# Patient Record
Sex: FEMALE | Race: WHITE | NOT HISPANIC OR LATINO | Employment: FULL TIME | URBAN - METROPOLITAN AREA
[De-identification: names, ages, dates, MRNs, and addresses within clinical notes are randomized per-mention and may not be internally consistent; named-entity substitution may affect disease eponyms.]

---

## 2018-02-26 ENCOUNTER — HISTORICAL (OUTPATIENT)
Dept: INTERNAL MEDICINE | Facility: CLINIC | Age: 23
End: 2018-02-26

## 2018-02-26 LAB
ALBUMIN SERPL-MCNC: 4.2 GM/DL (ref 3.4–5)
ALBUMIN/GLOB SERPL: 1 RATIO (ref 1–2)
ALP SERPL-CCNC: 74 UNIT/L (ref 45–117)
ALT SERPL-CCNC: 17 UNIT/L (ref 12–78)
AMYLASE SERPL-CCNC: 36 UNIT/L (ref 25–115)
AST SERPL-CCNC: 12 UNIT/L (ref 15–37)
BILIRUB SERPL-MCNC: 0.6 MG/DL (ref 0.2–1)
BILIRUBIN DIRECT+TOT PNL SERPL-MCNC: 0.2 MG/DL
BILIRUBIN DIRECT+TOT PNL SERPL-MCNC: 0.4 MG/DL
BUN SERPL-MCNC: 11 MG/DL (ref 7–18)
CALCIUM SERPL-MCNC: 9.1 MG/DL (ref 8.5–10.1)
CHLORIDE SERPL-SCNC: 107 MMOL/L (ref 98–107)
CO2 SERPL-SCNC: 26 MMOL/L (ref 21–32)
CREAT SERPL-MCNC: 0.4 MG/DL (ref 0.6–1.3)
ERYTHROCYTE [DISTWIDTH] IN BLOOD BY AUTOMATED COUNT: 12.6 % (ref 11.5–14.5)
ERYTHROCYTE [SEDIMENTATION RATE] IN BLOOD: 8 MM/HR (ref 0–20)
GLOBULIN SER-MCNC: 3.5 GM/ML (ref 2.3–3.5)
GLUCOSE SERPL-MCNC: 84 MG/DL (ref 74–106)
HCT VFR BLD AUTO: 37.5 % (ref 35–46)
HGB BLD-MCNC: 12.9 GM/DL (ref 12–16)
MCH RBC QN AUTO: 32.3 PG (ref 26–34)
MCHC RBC AUTO-ENTMCNC: 34.4 GM/DL (ref 31–37)
MCV RBC AUTO: 94 FL (ref 80–100)
PLATELET # BLD AUTO: 201 X10(3)/MCL (ref 130–400)
PMV BLD AUTO: 10.6 FL (ref 7.4–10.4)
POTASSIUM SERPL-SCNC: 3.5 MMOL/L (ref 3.5–5.1)
PROT SERPL-MCNC: 7.7 GM/DL (ref 6.4–8.2)
RBC # BLD AUTO: 3.99 X10(6)/MCL (ref 4–5.2)
SODIUM SERPL-SCNC: 140 MMOL/L (ref 136–145)
WBC # SPEC AUTO: 9.7 X10(3)/MCL (ref 4.5–11)

## 2018-04-16 ENCOUNTER — HISTORICAL (OUTPATIENT)
Dept: ENDOSCOPY | Facility: HOSPITAL | Age: 23
End: 2018-04-16

## 2018-04-16 LAB — B-HCG SERPL QL: NEGATIVE

## 2020-03-10 LAB
BILIRUB SERPL-MCNC: NEGATIVE MG/DL
BLOOD URINE, POC: NORMAL
CLARITY, POC UA: CLEAR
COLOR, POC UA: YELLOW
GLUCOSE UR QL STRIP: NEGATIVE
KETONES UR QL STRIP: NEGATIVE
LEUKOCYTE EST, POC UA: NEGATIVE
NITRITE, POC UA: NEGATIVE
PH, POC UA: 5.5
PROTEIN, POC: NEGATIVE
SPECIFIC GRAVITY, POC UA: 1.02
UROBILINOGEN, POC UA: NORMAL

## 2021-01-19 LAB — POC BETA-HCG (QUAL): POSITIVE

## 2021-03-10 LAB
BASOPHILS # BLD AUTO: 0.01 X10(3)/MCL (ref 0.01–0.08)
BASOPHILS NFR BLD AUTO: 0.1 % (ref 0.1–1.2)
EOSINOPHIL # BLD AUTO: 0.08 X10(3)/MCL (ref 0.04–0.36)
EOSINOPHIL NFR BLD AUTO: 0.9 % (ref 0.7–7)
ERYTHROCYTE [DISTWIDTH] IN BLOOD BY AUTOMATED COUNT: 12.8 % (ref 11–14.5)
GLUCOSE 1H P 100 G GLC PO SERPL-MCNC: 128 MG/DL (ref 70–140)
HCT VFR BLD AUTO: 39.1 % (ref 36–48)
HCV AB SERPL QL IA: NORMAL
HGB BLD-MCNC: 13.4 G/DL (ref 11.8–16)
IMM GRANULOCYTES # BLD AUTO: 0.03 X10E3/UL (ref 0–0.03)
IMM GRANULOCYTES NFR BLD AUTO: 0.3 % (ref 0–0.5)
LYMPHOCYTES # BLD AUTO: 1.93 X10(3)/MCL (ref 1.16–3.74)
LYMPHOCYTES NFR BLD AUTO: 21.1 % (ref 20–55)
MCH RBC QN AUTO: 31.2 PG (ref 27–34)
MCHC RBC AUTO-ENTMCNC: 34.3 G/DL (ref 31–37)
MCV RBC AUTO: 90.9 FL (ref 79–99)
MONOCYTES # BLD AUTO: 0.57 X10(3)/MCL (ref 0.24–0.36)
MONOCYTES NFR BLD AUTO: 6.2 % (ref 4.7–12.5)
NEUTROPHILS # BLD AUTO: 6.54 X10(3)/MCL (ref 1.56–6.13)
NEUTROPHILS NFR BLD AUTO: 71.4 % (ref 37–73)
PLATELET # BLD AUTO: 222 X10(3)/MCL (ref 140–371)
PMV BLD AUTO: 11.5 FL (ref 9.4–12.4)
RBC # BLD AUTO: 4.3 X10(6)/MCL (ref 4–5.1)
WBC # SPEC AUTO: 9.2 X10(3)/MCL (ref 4–11.5)

## 2021-06-30 LAB
BASOPHILS # BLD AUTO: 0.02 X10(3)/MCL (ref 0.01–0.08)
BASOPHILS NFR BLD AUTO: 0.2 % (ref 0.1–1.2)
EOSINOPHIL # BLD AUTO: 0.05 X10(3)/MCL (ref 0.04–0.36)
EOSINOPHIL NFR BLD AUTO: 0.4 % (ref 0.7–7)
ERYTHROCYTE [DISTWIDTH] IN BLOOD BY AUTOMATED COUNT: 12.5 % (ref 11–14.5)
GLUCOSE 1H P 100 G GLC PO SERPL-MCNC: 129 MG/DL (ref 70–140)
HCT VFR BLD AUTO: 33 % (ref 36–48)
HGB BLD-MCNC: 11.1 G/DL (ref 11.8–16)
IMM GRANULOCYTES # BLD AUTO: 0.12 X10E3/UL (ref 0–0.03)
IMM GRANULOCYTES NFR BLD AUTO: 1 % (ref 0–0.5)
LYMPHOCYTES # BLD AUTO: 1.4 X10(3)/MCL (ref 1.16–3.74)
LYMPHOCYTES NFR BLD AUTO: 12.1 % (ref 20–55)
MCH RBC QN AUTO: 32.3 PG (ref 27–34)
MCHC RBC AUTO-ENTMCNC: 33.6 G/DL (ref 31–37)
MCV RBC AUTO: 95.9 FL (ref 79–99)
MONOCYTES # BLD AUTO: 0.66 X10(3)/MCL (ref 0.24–0.36)
MONOCYTES NFR BLD AUTO: 5.7 % (ref 4.7–12.5)
NEUTROPHILS # BLD AUTO: 9.31 X10(3)/MCL (ref 1.56–6.13)
NEUTROPHILS NFR BLD AUTO: 80.6 % (ref 37–73)
PLATELET # BLD AUTO: 222 X10(3)/MCL (ref 140–371)
PMV BLD AUTO: 11.1 FL (ref 9.4–12.4)
RBC # BLD AUTO: 3.44 X10(6)/MCL (ref 4–5.1)
RPR SER QL: NORMAL
WBC # SPEC AUTO: 11.6 X10(3)/MCL (ref 4–11.5)

## 2021-08-11 LAB
ALBUMIN SERPL-MCNC: 3.5 G/DL (ref 3.4–5)
ALBUMIN/GLOB SERPL: 1.3 {RATIO}
ALP SERPL-CCNC: 173 U/L (ref 50–144)
ALT SERPL-CCNC: 20 U/L (ref 1–45)
ANION GAP SERPL CALC-SCNC: 7 MMOL/L (ref 7–16)
AST SERPL-CCNC: 22 U/L (ref 14–36)
BASOPHILS # BLD AUTO: 0.03 X10(3)/MCL (ref 0.01–0.08)
BASOPHILS NFR BLD AUTO: 0.3 % (ref 0.1–1.2)
BILIRUB SERPL-MCNC: 0.35 MG/DL (ref 0.1–1)
BUN SERPL-MCNC: 7 MG/DL (ref 7–20)
CALCIUM SERPL-MCNC: 9.2 MG/DL (ref 8.4–10.2)
CHLORIDE SERPL-SCNC: 107 MMOL/L (ref 94–110)
CO2 SERPL-SCNC: 21 MMOL/L (ref 21–32)
CREAT SERPL-MCNC: 0.3 MG/DL (ref 0.52–1.04)
CREAT/UREA NIT SERPL: 23.3 (ref 12–20)
EOSINOPHIL # BLD AUTO: 0.07 X10(3)/MCL (ref 0.04–0.36)
EOSINOPHIL NFR BLD AUTO: 0.6 % (ref 0.7–7)
ERYTHROCYTE [DISTWIDTH] IN BLOOD BY AUTOMATED COUNT: 12.6 % (ref 11–14.5)
GLOBULIN SER-MCNC: 2.8 G/DL (ref 2–3.9)
GLUCOSE SERPL-MCNC: 108 MGM./DL (ref 70–115)
HCT VFR BLD AUTO: 32.1 % (ref 36–48)
HGB BLD-MCNC: 10.7 G/DL (ref 11.8–16)
IMM GRANULOCYTES # BLD AUTO: 0.09 X10E3/UL (ref 0–0.03)
IMM GRANULOCYTES NFR BLD AUTO: 0.8 % (ref 0–0.5)
LDH SERPL-CCNC: 324 U/L (ref 313–618)
LYMPHOCYTES # BLD AUTO: 1.71 X10(3)/MCL (ref 1.16–3.74)
LYMPHOCYTES NFR BLD AUTO: 14.3 % (ref 20–55)
MCH RBC QN AUTO: 30.7 PG (ref 27–34)
MCHC RBC AUTO-ENTMCNC: 33.3 G/DL (ref 31–37)
MCV RBC AUTO: 92.2 FL (ref 79–99)
MONOCYTES # BLD AUTO: 0.66 X10(3)/MCL (ref 0.24–0.36)
MONOCYTES NFR BLD AUTO: 5.5 % (ref 4.7–12.5)
NEUTROPHILS # BLD AUTO: 9.43 X10(3)/MCL (ref 1.56–6.13)
NEUTROPHILS NFR BLD AUTO: 78.5 % (ref 37–73)
PLATELET # BLD AUTO: 234 X10(3)/MCL (ref 140–371)
PMV BLD AUTO: 11.5 FL (ref 9.4–12.4)
POTASSIUM SERPL-SCNC: 3.8 MMOL/L (ref 3.5–5.1)
PROT SERPL-MCNC: 6.3 G/DL (ref 6.3–8.2)
RBC # BLD AUTO: 3.48 X10(6)/MCL (ref 4–5.1)
SODIUM SERPL-SCNC: 135 MMOL/L (ref 135–145)
URATE SERPL-MCNC: 3.2 MG/DL (ref 2.6–7.2)
WBC # SPEC AUTO: 12 X10(3)/MCL (ref 4–11.5)

## 2021-09-02 LAB
HBV SURFACE AG SERPL QL IA: NEGATIVE
HIV 1+2 AB+HIV1 P24 AG SERPL QL IA: NORMAL
RPR SER QL: NORMAL

## 2021-09-09 LAB
BILIRUB SERPL-MCNC: NEGATIVE MG/DL
BLOOD URINE, POC: NORMAL
CLARITY, POC UA: NORMAL
COLOR, POC UA: YELLOW
GLUCOSE UR QL STRIP: NEGATIVE
KETONES UR QL STRIP: NEGATIVE
LEUKOCYTE EST, POC UA: NORMAL
NITRITE, POC UA: NEGATIVE
PH, POC UA: 7
PROTEIN, POC: NORMAL
SPECIFIC GRAVITY, POC UA: 1.02
UROBILINOGEN, POC UA: NORMAL

## 2021-10-19 LAB
PAP RECOMMENDATION EXT: NORMAL
PAP SMEAR: NORMAL

## 2022-04-11 ENCOUNTER — HISTORICAL (OUTPATIENT)
Dept: ADMINISTRATIVE | Facility: HOSPITAL | Age: 27
End: 2022-04-11

## 2022-04-27 VITALS
OXYGEN SATURATION: 98 % | SYSTOLIC BLOOD PRESSURE: 118 MMHG | DIASTOLIC BLOOD PRESSURE: 70 MMHG | BODY MASS INDEX: 30.22 KG/M2 | HEIGHT: 66 IN | WEIGHT: 188 LBS

## 2022-05-08 ENCOUNTER — HISTORICAL (OUTPATIENT)
Dept: ADMINISTRATIVE | Facility: HOSPITAL | Age: 27
End: 2022-05-08

## 2022-09-19 NOTE — OP NOTE
DATE OF SURGERY:    04/16/2018    ATTENDING PHYSICIAN:  Jena Moreland MD    RESIDENT: Blessing Malagon MD, PGY2    PREOPERATIVE DIAGNOSES:    1. Chronic abdominal pain.  2. History of gastroesophageal reflux disease.  3. History of colitis.    POSTOPERATIVE DIAGNOSES:    1. Chronic abdominal pain.  2. Gastritis.  3. Small hiatal hernia.    PROCEDURES PERFORMED:    1. Esophagogastroduodenoscopy with biopsies.    2. Colonoscopy.    ANESTHESIA:  Sedation with propofol.    INDICATIONS FOR PROCEDURE:  Patient is a 22-year-old female who has a history of chronic abdominal pain associated with episodic nausea and vomiting as well as diarrhea.  The patient has experienced this for many years.  She has a history of GERD treated with a proton pump inhibitor.  She last had an EGD and colonoscopy in 2015 at an outside facility.  Per report, at that time she was diagnosed with nonspecific colitis.  She was referred to our GI clinic for repeat EGD and colonoscopy since she is having persistent symptoms.    PROCEDURE IN DETAIL:  After appropriate informed consent had been obtained and all questions had been answered, the patient was brought to the procedure room.  She was connected to the appropriate monitoring devices.  Oxygen was administered to the patient continuously via nasal cannula.  A time-out was performed verifying correct patient and procedure.  The patient was then placed in the left lateral decubitus position.  IV sedation was administered intermittently throughout the procedure by the anesthesia team.  After the appropriate level of sedation had been achieved, the procedure was started.  We began with the upper endoscopy.  A well-lubricated endoscope was advanced under direct visualization over the tongue and through the esophagus, the stomach, and the duodenum.  It was advanced to the 2nd portion of the duodenum.  The scope was then slowly withdrawn and the mucosa was carefully evaluated.  No duodenal mucosal  1140  As per provider order, a Volare splint and sling was applied to the patient's left wrist. Patient had good capillary refill, sensation, and movement in the fingers of the left hand, both before and after application of the splint and sling.      Bar Zambrano  09/19/22 114 abnormalities were identified.  Views of the stomach did demonstrate some mild nonerosive gastritis, predominantly in the antrum.  Biopsies of the antrum were taken using cold forceps x2.  The remainder of the stomach was then carefully inspected.  The scope was retroflexed.  The patient was noted to have a very small hiatal hernia.  No other abnormalities were seen.  The scope was then withdrawn through the GE junction for careful examination of the esophagus.  The Z-line was identified and there was no evidence of Wong's esophagus.  No abnormalities of the esophageal mucosa were seen.  The scope was then completely withdrawn and this portion of the procedure completed.     We then proceeded with the patient's colonoscope.  Digital rectal exam was performed.  Exam was within normal limits.  A well-lubricated colonoscope was then inserted into the rectum and advanced under direct visualization to the cecum.  The cecum was identified by seeing the appendiceal orifice and the ileocecal valve.  The scope was advanced into the terminal ileum to assess for any enteritis.  The mucosa was carefully inspected.  There were no abnormalities noted in this area.  The scope was then fully withdrawn through the colon while examining the color, texture, anatomy, and integrity of the mucosa from the cecum to the anal canal.  No abnormalities were noted.  The scope was then removed and the procedure was complete.  The patient was awakened from anesthesia and transferred to the recovery room in stable condition.    ENDOSCOPIC DIAGNOSES:    1. Gastritis.  2. Small hiatal hernia.    COMPLICATIONS:  None.    ESTIMATED BLOOD LOSS:  Minimal.    SPECIMENS:  Biopsies of stomach antrum x2.    FOLLOWUP:  Patient will follow up in GI clinic for biopsy results.  She will need repeat colonoscopy at age 52 unless clinically indicated sooner.        ______________________________  LILI GALLEGOS MD    ______________________________  Jena  MD BRYSON Moreland/LY  DD:  04/16/2018  Time:  03:48PM  DT:  04/16/2018  Time:  04:42PM  Job #:  [id number]

## 2022-09-22 ENCOUNTER — HISTORICAL (OUTPATIENT)
Dept: ADMINISTRATIVE | Facility: HOSPITAL | Age: 27
End: 2022-09-22

## 2022-10-28 ENCOUNTER — OFFICE VISIT (OUTPATIENT)
Dept: PRIMARY CARE CLINIC | Facility: CLINIC | Age: 27
End: 2022-10-28
Payer: MEDICAID

## 2022-10-28 VITALS
DIASTOLIC BLOOD PRESSURE: 84 MMHG | HEART RATE: 69 BPM | WEIGHT: 183.63 LBS | BODY MASS INDEX: 29.51 KG/M2 | TEMPERATURE: 98 F | OXYGEN SATURATION: 98 % | HEIGHT: 66 IN | SYSTOLIC BLOOD PRESSURE: 122 MMHG

## 2022-10-28 DIAGNOSIS — K52.9 COLITIS: ICD-10-CM

## 2022-10-28 DIAGNOSIS — R10.10 PAIN OF UPPER ABDOMEN: Primary | ICD-10-CM

## 2022-10-28 PROCEDURE — 1159F MED LIST DOCD IN RCRD: CPT | Mod: CPTII,,, | Performed by: FAMILY MEDICINE

## 2022-10-28 PROCEDURE — 3079F PR MOST RECENT DIASTOLIC BLOOD PRESSURE 80-89 MM HG: ICD-10-PCS | Mod: CPTII,,, | Performed by: FAMILY MEDICINE

## 2022-10-28 PROCEDURE — 1159F PR MEDICATION LIST DOCUMENTED IN MEDICAL RECORD: ICD-10-PCS | Mod: CPTII,,, | Performed by: FAMILY MEDICINE

## 2022-10-28 PROCEDURE — 99213 OFFICE O/P EST LOW 20 MIN: CPT | Mod: PBBFAC,PN | Performed by: FAMILY MEDICINE

## 2022-10-28 PROCEDURE — 3074F PR MOST RECENT SYSTOLIC BLOOD PRESSURE < 130 MM HG: ICD-10-PCS | Mod: CPTII,,, | Performed by: FAMILY MEDICINE

## 2022-10-28 PROCEDURE — 3074F SYST BP LT 130 MM HG: CPT | Mod: CPTII,,, | Performed by: FAMILY MEDICINE

## 2022-10-28 PROCEDURE — 99204 PR OFFICE/OUTPT VISIT, NEW, LEVL IV, 45-59 MIN: ICD-10-PCS | Mod: S$PBB,,, | Performed by: FAMILY MEDICINE

## 2022-10-28 PROCEDURE — 3079F DIAST BP 80-89 MM HG: CPT | Mod: CPTII,,, | Performed by: FAMILY MEDICINE

## 2022-10-28 PROCEDURE — 99999 PR PBB SHADOW E&M-EST. PATIENT-LVL III: ICD-10-PCS | Mod: PBBFAC,,, | Performed by: FAMILY MEDICINE

## 2022-10-28 PROCEDURE — 99204 OFFICE O/P NEW MOD 45 MIN: CPT | Mod: S$PBB,,, | Performed by: FAMILY MEDICINE

## 2022-10-28 PROCEDURE — 99999 PR PBB SHADOW E&M-EST. PATIENT-LVL III: CPT | Mod: PBBFAC,,, | Performed by: FAMILY MEDICINE

## 2022-10-28 RX ORDER — PROMETHAZINE HYDROCHLORIDE 25 MG/1
SUPPOSITORY RECTAL
COMMUNITY
Start: 2022-10-19

## 2022-10-28 RX ORDER — PROMETHAZINE HYDROCHLORIDE 25 MG/1
25 TABLET ORAL EVERY 8 HOURS PRN
COMMUNITY
Start: 2022-09-27

## 2022-10-28 RX ORDER — PANTOPRAZOLE SODIUM 40 MG/1
40 TABLET, DELAYED RELEASE ORAL DAILY
COMMUNITY
Start: 2022-10-12

## 2022-10-28 RX ORDER — CLINDAMYCIN HYDROCHLORIDE 300 MG/1
CAPSULE ORAL
COMMUNITY
End: 2022-10-28 | Stop reason: ALTCHOICE

## 2022-10-28 RX ORDER — AMOXICILLIN AND CLAVULANATE POTASSIUM 875; 125 MG/1; MG/1
1 TABLET, FILM COATED ORAL 2 TIMES DAILY
COMMUNITY
Start: 2022-05-26 | End: 2022-10-28 | Stop reason: ALTCHOICE

## 2022-10-28 NOTE — PROGRESS NOTES
Subjective:       Patient ID: Fransisca Mar is a 27 y.o. female.    Chief Complaint: Abd pain and Gi referral        History of Present Illness:   Fransisca Mar 27 y.o. female presents today with   She thought she was coming in for a GI visit. She was seen in the ER on 10/19 for acute on chronic abd pain.  Abd pain for more than 10 years. Usually upper abd and radiates to mid sternum.  Pain is a constant ache that could last 1-2 weeks, wakes her up in her sleep and nothing makes it better.  She has been on numerous meds and protonix for at least 4 years. She has had blood work, US, xray, CT and Scopes-both upper and lower GI.  Hers stool are always loose with mucus and she was diagnosed with colitis at a time.   Asso with nasuea and vomiting.  No food triggers.  Nothing helps with the pain.  She's had cholecystectomy and C-S x 2     Currently on pantoprazole and bentyl. She has no sxs today and abd exam is benign    Hypokalemia: has resolved.  She is not looking to establish care as she has a PCP already.    History reviewed. No pertinent past medical history.  No family history on file.  Social History     Socioeconomic History    Marital status:    Tobacco Use    Smoking status: Every Day     Types: Cigarettes   Substance and Sexual Activity    Alcohol use: Never    Drug use: Never     Outpatient Encounter Medications as of 10/28/2022   Medication Sig Dispense Refill    amoxicillin-clavulanate 875-125mg (AUGMENTIN) 875-125 mg per tablet Take 1 tablet by mouth 2 (two) times daily.      clindamycin (CLEOCIN) 300 MG capsule clindamycin HCl 300 mg capsule      pantoprazole (PROTONIX) 40 MG tablet Take 40 mg by mouth once daily.      promethazine (PHENERGAN) 25 MG tablet Take 25 mg by mouth every 8 (eight) hours as needed.      PROMETHEGAN 25 mg suppository Place rectally.       No facility-administered encounter medications on file as of 10/28/2022.       Review of Systems    Review of Systems      A  "complete 10 point ROS was completed and are positive as per above HPI.    Otherwise negative for fever, diplopia, chest pain, shortness of breath, vomiting, blood in urine, joint pain, skin rash, seizures and unusual bleeding.       Objective:      /84 (BP Location: Left arm, Patient Position: Sitting, BP Method: Medium (Manual))   Pulse 69   Temp 98.2 °F (36.8 °C) (Temporal)   Ht 5' 6" (1.676 m)   Wt 83.3 kg (183 lb 9.6 oz)   SpO2 98%   BMI 29.63 kg/m²   Physical Exam  Abdominal:      General: Abdomen is flat.      Palpations: Abdomen is soft.      Tenderness: There is no abdominal tenderness.      Hernia: No hernia is present.       CONSTITUTIONAL: No apparent distress. Appears comfortable. Does not appear acutely ill or septic. Appears adequately hydrated.  CARDIOVASCULAR: No perioral cyanosis  PULMONARY: Breathing unlabored. No retractions Chest expansion grossly normal.  PSYCHIATRIC: Alert and conversant and grossly oriented. Mood is grossly neutral. Affect appropriate. Judgment and insight grossly intact.  NEUROLOGIC: No focal sensory deficits reported.     Results for orders placed or performed in visit on 09/22/22   POCT Urinalysis   Result Value Ref Range    Glucose, UA Negative     Bilirubin, POC Negative     Ketones, UA Negative     Color, UA Yellow     Clarity, UA Cloudy     Spec Grav UA 1.020     Blood, UA Large     pH, UA 7     Protein, POC Trace     Urobilinogen, UA 0.2 mg/dl     Nitrite, UA Negative     Leukocytes, UA Moderate      Assessment:       1. Pain of upper abdomen    2. Colitis        Plan:   Pain of upper abdomen  -     Ambulatory referral/consult to Gastroenterology; Future; Expected date: 11/04/2022    Colitis  -     Ambulatory referral/consult to Gastroenterology; Future; Expected date: 11/04/2022    She will get us all her previous records.    Treatment options and alternatives were discussed with the patient. Patient was given ample time to ask questions. All questions " were answered. Voices understanding and acceptance of this advice. Will call back if any further questions or concerns.      Sierra Gutierrez MD

## 2023-01-19 ENCOUNTER — DOCUMENTATION ONLY (OUTPATIENT)
Dept: ADMINISTRATIVE | Facility: HOSPITAL | Age: 28
End: 2023-01-19
Payer: MEDICAID

## 2023-06-13 ENCOUNTER — OFFICE VISIT (OUTPATIENT)
Dept: OBSTETRICS AND GYNECOLOGY | Facility: CLINIC | Age: 28
End: 2023-06-13
Payer: MEDICAID

## 2023-06-13 VITALS — BODY MASS INDEX: 28.77 KG/M2 | WEIGHT: 179 LBS | HEIGHT: 66 IN

## 2023-06-13 DIAGNOSIS — Z01.419 ROUTINE GYNECOLOGICAL EXAMINATION: Primary | ICD-10-CM

## 2023-06-13 PROCEDURE — 1159F PR MEDICATION LIST DOCUMENTED IN MEDICAL RECORD: ICD-10-PCS | Mod: CPTII,,, | Performed by: NURSE PRACTITIONER

## 2023-06-13 PROCEDURE — 1160F RVW MEDS BY RX/DR IN RCRD: CPT | Mod: CPTII,,, | Performed by: NURSE PRACTITIONER

## 2023-06-13 PROCEDURE — 99395 PR PREVENTIVE VISIT,EST,18-39: ICD-10-PCS | Mod: ,,, | Performed by: NURSE PRACTITIONER

## 2023-06-13 PROCEDURE — 3008F PR BODY MASS INDEX (BMI) DOCUMENTED: ICD-10-PCS | Mod: CPTII,,, | Performed by: NURSE PRACTITIONER

## 2023-06-13 PROCEDURE — 3008F BODY MASS INDEX DOCD: CPT | Mod: CPTII,,, | Performed by: NURSE PRACTITIONER

## 2023-06-13 PROCEDURE — 99395 PREV VISIT EST AGE 18-39: CPT | Mod: ,,, | Performed by: NURSE PRACTITIONER

## 2023-06-13 PROCEDURE — 1159F MED LIST DOCD IN RCRD: CPT | Mod: CPTII,,, | Performed by: NURSE PRACTITIONER

## 2023-06-13 PROCEDURE — 1160F PR REVIEW ALL MEDS BY PRESCRIBER/CLIN PHARMACIST DOCUMENTED: ICD-10-PCS | Mod: CPTII,,, | Performed by: NURSE PRACTITIONER

## 2023-06-13 RX ORDER — DEXTROAMPHETAMINE SACCHARATE, AMPHETAMINE ASPARTATE, DEXTROAMPHETAMINE SULFATE AND AMPHETAMINE SULFATE 5; 5; 5; 5 MG/1; MG/1; MG/1; MG/1
1 TABLET ORAL 2 TIMES DAILY
COMMUNITY
Start: 2023-05-12

## 2023-06-13 RX ORDER — ASPIRIN 325 MG
50000 TABLET, DELAYED RELEASE (ENTERIC COATED) ORAL
COMMUNITY
Start: 2023-04-28

## 2023-06-13 NOTE — PROGRESS NOTES
Patient ID: [id number]   Chief Complaint: Annual exam  Chief Complaint   Patient presents with    Well Woman     No C/oS     HPI:   Fransisca Mar is a 27 y.o. year old  here for her Annual Exam. Patient's last menstrual period was 2023. She is doing well. Denies any health changes. Well Woman (No C/oS)    Subjective:     Past Medical History:   Diagnosis Date    ADD (attention deficit disorder)     GERD (gastroesophageal reflux disease)      Past Surgical History:   Procedure Laterality Date     SECTION      CHOLECYSTECTOMY      COLPOSCOPY      ENDOMETRIAL ABLATION      HERNIA REPAIR      OOPHORECTOMY       Social History     Tobacco Use    Smoking status: Every Day     Types: Cigarettes   Substance Use Topics    Alcohol use: Never    Drug use: Not Currently     History reviewed. No pertinent family history.  OB History    Para Term  AB Living   3 2 2   1 2   SAB IAB Ectopic Multiple Live Births           2      # Outcome Date GA Lbr Jose Angel/2nd Weight Sex Delivery Anes PTL Lv   3 Term 21 37w0d  3.544 kg (7 lb 13 oz) F CS-Unspec Spinal  SMILEY   2 AB 05/15/20 5w0d    ECTOPIC   ND   1 Term 18 39w0d  3.6 kg (7 lb 15 oz) M CS-Unspec EPI  SMILEY       Current Outpatient Medications:     cholecalciferol, vitamin D3, 1,250 mcg (50,000 unit) capsule, Take 50,000 Units by mouth every 7 days., Disp: , Rfl:     dextroamphetamine-amphetamine (ADDERALL) 20 mg tablet, Take 1 tablet by mouth 2 (two) times daily., Disp: , Rfl:     pantoprazole (PROTONIX) 40 MG tablet, Take 40 mg by mouth once daily., Disp: , Rfl:     promethazine (PHENERGAN) 25 MG tablet, Take 25 mg by mouth every 8 (eight) hours as needed., Disp: , Rfl:     PROMETHEGAN 25 mg suppository, Place rectally., Disp: , Rfl:   MENARCHEAL:  Cycle Length: 7 days   Flow: normal  Dysmenorrhea: No  If yes: Mild  Intermenstrual Bleeding: No  PAP:  Last PAP: 10/19/2021    History of Abnormal PAP Smear: NO    INTERCOURSE:  Dyspareunia:  "No  Postcoital Bleeding: No  History of STI: No  Current Birth Control Method: none  Sexually Active: yes    Review of Systems 12 point review of systems conducted, negative except as stated in the history of present illness. See HPI for details.  Objective:   Visit Vitals  Ht 5' 6" (1.676 m)   Wt 81.2 kg (179 lb)   LMP 05/25/2023   BMI 28.89 kg/m²     Physical Exam:  Physical Exam  Constitutional:  General Appearance : alert, in no acute distress, normal, well nourished.  Respiratory:  Respiratory Effort: normal.  Breast:  Right: Inspection/palpation: no discharge, no masses present, no nipple retraction, no skin changes, no skin dimpling, no tenderness, no lymphadenopathy, no axillary mass, no axillary tenderness.  Left: Inspection/palpation: no discharge, no masses present, no nipple retraction, no skin changes, no skin dimpling, no tenderness, no lymphadenopathy, no axillary mass, no axillary tenderness.  Gastrointestinal:  Abdomen: no masses. no tender, nondistended.  Liver and spleen: normal  Hernias: no hernias present, no inguinal adenopathy.  Genitourinary:  External Genitalia: normal, no lesions.  Vagina: normal appearance, no abnormal discharge, no lesions.  Bladder: no mass, nontender.  Urethra: no erythema or lesions present.  Cervix: no lesions, non tender. Pap Done  Uterus: nontender, normal contour, normal mobility, normal size.   Adnexa: no masses, no tenderness.  Anus and Perineum: visually normal.   Chaperone Present  No results found for this or any previous visit (from the past 24 hour(s)).  Assessment/Plan:   Assessment:   Routine gynecological examination  -     Liquid-Based Pap Smear, Screening Screening      Follow up in about 1 year (around 6/13/2024) for ANNUAL. In addition to their scheduled FU, the patient has also been instructed to follow up on as needed basis. All questions were answered and the patient voiced understanding of the above issues.  "

## 2023-06-19 LAB — PSYCHE PATHOLOGY RESULT: NORMAL

## 2024-09-18 ENCOUNTER — OFFICE VISIT (OUTPATIENT)
Dept: OBSTETRICS AND GYNECOLOGY | Facility: CLINIC | Age: 29
End: 2024-09-18
Payer: MEDICAID

## 2024-09-18 VITALS
BODY MASS INDEX: 30.02 KG/M2 | SYSTOLIC BLOOD PRESSURE: 110 MMHG | WEIGHT: 186 LBS | DIASTOLIC BLOOD PRESSURE: 60 MMHG | HEART RATE: 90 BPM

## 2024-09-18 DIAGNOSIS — Z11.3 ENCOUNTER FOR SCREENING FOR INFECTIONS WITH A PREDOMINANTLY SEXUAL MODE OF TRANSMISSION: ICD-10-CM

## 2024-09-18 DIAGNOSIS — R10.2 PELVIC PAIN: Primary | ICD-10-CM

## 2024-09-18 PROCEDURE — 3074F SYST BP LT 130 MM HG: CPT | Mod: CPTII,,, | Performed by: NURSE PRACTITIONER

## 2024-09-18 PROCEDURE — 1160F RVW MEDS BY RX/DR IN RCRD: CPT | Mod: CPTII,,, | Performed by: NURSE PRACTITIONER

## 2024-09-18 PROCEDURE — 1159F MED LIST DOCD IN RCRD: CPT | Mod: CPTII,,, | Performed by: NURSE PRACTITIONER

## 2024-09-18 PROCEDURE — 3078F DIAST BP <80 MM HG: CPT | Mod: CPTII,,, | Performed by: NURSE PRACTITIONER

## 2024-09-18 PROCEDURE — 3008F BODY MASS INDEX DOCD: CPT | Mod: CPTII,,, | Performed by: NURSE PRACTITIONER

## 2024-09-18 PROCEDURE — 99213 OFFICE O/P EST LOW 20 MIN: CPT | Mod: ,,, | Performed by: NURSE PRACTITIONER

## 2024-09-18 RX ORDER — PANTOPRAZOLE SODIUM 20 MG/1
TABLET, DELAYED RELEASE ORAL
COMMUNITY
Start: 2024-07-20

## 2024-09-18 RX ORDER — IBUPROFEN 800 MG/1
800 TABLET ORAL EVERY 8 HOURS
COMMUNITY
Start: 2024-09-10

## 2024-09-18 RX ORDER — NAPROXEN SODIUM 550 MG/1
550 TABLET ORAL 2 TIMES DAILY PRN
Qty: 30 TABLET | Refills: 3 | Status: SHIPPED | OUTPATIENT
Start: 2024-09-18

## 2024-09-18 NOTE — PROGRESS NOTES
Patient ID: [id number]   Chief Complaint: PROBLEM (C/O 1 MONTH AGO HAVING PELVIC PAIN , WHEN TO [facility] PT STATED HAS CYST ON OVARY WHEN CYCLE COME HAVING PELVIC PAIN.)    HPI:   Fransisca Mar is a 28 y.o.  here today for PROBLEM (C/O 1 MONTH AGO HAVING PELVIC PAIN , WHEN TO [facility] PT STATED HAS CYST ON OVARY WHEN CYCLE COME HAVING PELVIC PAIN.)  Pt cycles are regular and period is not heavy or painful. Reports her pelvic pain in about 2 weeks after her period every month for a few days then resolves. States the pain causes her to have nausea and vomiting the pain is so bad at times.  Patient's last menstrual period was 2024.  Past Medical History:  has a past medical history of ADD (attention deficit disorder) and GERD (gastroesophageal reflux disease).  Surgical History:  has a past surgical history that includes  section; Hernia repair; Cholecystectomy; Colposcopy; Oophorectomy; and Endometrial ablation.  Family History: family history is not on file.  Social History:  reports that she has been smoking cigarettes. She does not have any smokeless tobacco history on file. She reports that she does not currently use drugs. She reports that she does not drink alcohol.  Current Outpatient Medications   Medication Sig Dispense Refill    cholecalciferol, vitamin D3, 1,250 mcg (50,000 unit) capsule Take 50,000 Units by mouth every 7 days.      dextroamphetamine-amphetamine (ADDERALL) 20 mg tablet Take 1 tablet by mouth 2 (two) times daily.       mg tablet Take 800 mg by mouth every 8 (eight) hours.      pantoprazole (PROTONIX) 20 MG tablet Take by mouth.      promethazine (PHENERGAN) 25 MG tablet Take 25 mg by mouth every 8 (eight) hours as needed.      naproxen sodium (ANAPROX) 550 MG tablet Take 1 tablet (550 mg total) by mouth 2 (two) times daily as needed (PELVIC PAIN). 30 tablet 3     No current facility-administered medications for this visit.     Patient is allergic to adhesive and  meperidine.  MENARCHEAL:  Cycle Length: 5 days   Flow: normal  Dysmenorrhea: No  Intermenstrual Bleeding: No  PAP:  Last PAP: 6/19/2023    History of Abnormal PAP Smear: NO  HPV Vaccine: NO  INTERCOURSE:  Dyspareunia: No  Postcoital Bleeding: No  History of STI: No   If yes, then: No   Current Birth Control Method: none  Sexually Active: yes  No results found for this or any previous visit (from the past 24 hour(s)).    Subjective:   Review of Systems  12 point review of systems conducted, negative except as stated in the history of present illness. See HPI for details.  Objective:     Visit Vitals  /60   Pulse 90   Wt 84.4 kg (186 lb)   LMP 08/20/2024   BMI 30.02 kg/m²     No results found for this or any previous visit (from the past 24 hour(s)).  Physical Exam  Constitutional:  General Appearance : alert, in no acute distress, normal, well nourished.  Neck/Thyroid:  Inspection/Palpation: normal.  Respiratory:  Respiratory Effort:   Gastrointestinal:  Abdomen: no masses. no tender, nondistended.  Liver and spleen: normal  Hernias: no hernias present, no inguinal adenopathy.  Genitourinary:  External Genitalia: normal, no lesions.  Vagina: normal appearance, no abnormal discharge, no lesions.  Bladder: no mass, nontender.  Urethra: no erythema or lesions present.  Cervix: no lesions, non tender. ONE SWAB COLLECTED  Uterus: nontender, normal contour, normal mobility, normal size.   Adnexa: no masses, no tenderness.  Anus and Perineum: visually normal.   Chaperone Present  Assessment:       ICD-10-CM ICD-9-CM   1. Pelvic pain  R10.2 [id number]   2. Encounter for screening for infections with a predominantly sexual mode of transmission  Z11.3 V74.5     Plan   Pelvic pain  -     MDL Sendout Test  -     US Pelvis Complete Non OB; Future; Expected date: 09/18/2024  -     naproxen sodium (ANAPROX) 550 MG tablet; Take 1 tablet (550 mg total) by mouth 2 (two) times daily as needed (PELVIC PAIN).  Dispense: 30 tablet;  Refill: 3    Encounter for screening for infections with a predominantly sexual mode of transmission  -     MDL Sendout Test    Pt is not interested in contraception at this time as it causes her to feel bad and she is also concerned it would alter her period.     No follow-ups on file. In addition to their scheduled follow up, the patient has also been instructed to follow up on as needed basis.     ANGELINA Starks

## 2024-11-14 ENCOUNTER — HOSPITAL ENCOUNTER (EMERGENCY)
Facility: HOSPITAL | Age: 29
Discharge: HOME | End: 2024-11-14
Attending: EMERGENCY MEDICINE
Payer: MEDICAID

## 2024-11-14 VITALS
RESPIRATION RATE: 18 BRPM | OXYGEN SATURATION: 100 % | HEIGHT: 66 IN | WEIGHT: 180 LBS | SYSTOLIC BLOOD PRESSURE: 132 MMHG | DIASTOLIC BLOOD PRESSURE: 87 MMHG | BODY MASS INDEX: 28.93 KG/M2 | TEMPERATURE: 98 F | HEART RATE: 70 BPM

## 2024-11-14 DIAGNOSIS — R10.32 LEFT LOWER QUADRANT ABDOMINAL PAIN: Primary | ICD-10-CM

## 2024-11-14 DIAGNOSIS — N93.9 VAGINAL BLEEDING: ICD-10-CM

## 2024-11-14 LAB
ALBUMIN SERPL-MCNC: 5 G/DL (ref 3.5–5)
ALBUMIN/GLOB SERPL: 1.5 RATIO (ref 1.1–2)
ALP SERPL-CCNC: 72 UNIT/L (ref 40–150)
ALT SERPL-CCNC: 49 UNIT/L (ref 0–55)
ANION GAP SERPL CALC-SCNC: 9 MEQ/L
AST SERPL-CCNC: 28 UNIT/L (ref 5–34)
B-HCG UR QL: NEGATIVE
B-HCG UR QL: NEGATIVE
BACTERIA #/AREA URNS AUTO: ABNORMAL /HPF
BASOPHILS # BLD AUTO: 0.02 X10(3)/MCL
BASOPHILS NFR BLD AUTO: 0.3 %
BILIRUB SERPL-MCNC: 0.8 MG/DL
BILIRUB UR QL STRIP.AUTO: NEGATIVE
BUN SERPL-MCNC: 13 MG/DL (ref 7–18.7)
C TRACH DNA SPEC QL NAA+PROBE: NOT DETECTED
CALCIUM SERPL-MCNC: 10.2 MG/DL (ref 8.4–10.2)
CHLORIDE SERPL-SCNC: 109 MMOL/L (ref 98–107)
CLARITY UR: ABNORMAL
CO2 SERPL-SCNC: 22 MMOL/L (ref 22–29)
COLOR UR AUTO: ABNORMAL
CREAT SERPL-MCNC: 0.69 MG/DL (ref 0.55–1.02)
CREAT/UREA NIT SERPL: 19
CTP QC/QA: YES
EOSINOPHIL # BLD AUTO: 0.01 X10(3)/MCL (ref 0–0.9)
EOSINOPHIL NFR BLD AUTO: 0.1 %
ERYTHROCYTE [DISTWIDTH] IN BLOOD BY AUTOMATED COUNT: 13.2 % (ref 11.5–17)
GFR SERPLBLD CREATININE-BSD FMLA CKD-EPI: >60 ML/MIN/1.73/M2
GLOBULIN SER-MCNC: 3.4 GM/DL (ref 2.4–3.5)
GLUCOSE SERPL-MCNC: 120 MG/DL (ref 74–100)
GLUCOSE UR QL STRIP: NORMAL
HCT VFR BLD AUTO: 42.1 % (ref 37–47)
HGB BLD-MCNC: 14.5 G/DL (ref 12–16)
HGB UR QL STRIP: ABNORMAL
IMM GRANULOCYTES # BLD AUTO: 0.02 X10(3)/MCL (ref 0–0.04)
IMM GRANULOCYTES NFR BLD AUTO: 0.3 %
KETONES UR QL STRIP: ABNORMAL
LEUKOCYTE ESTERASE UR QL STRIP: 25
LYMPHOCYTES # BLD AUTO: 1.32 X10(3)/MCL (ref 0.6–4.6)
LYMPHOCYTES NFR BLD AUTO: 17.4 %
MCH RBC QN AUTO: 31.9 PG (ref 27–31)
MCHC RBC AUTO-ENTMCNC: 34.4 G/DL (ref 33–36)
MCV RBC AUTO: 92.5 FL (ref 80–94)
MONOCYTES # BLD AUTO: 0.52 X10(3)/MCL (ref 0.1–1.3)
MONOCYTES NFR BLD AUTO: 6.9 %
MUCOUS THREADS URNS QL MICRO: ABNORMAL /LPF
N GONORRHOEA DNA SPEC QL NAA+PROBE: NOT DETECTED
NEUTROPHILS # BLD AUTO: 5.7 X10(3)/MCL (ref 2.1–9.2)
NEUTROPHILS NFR BLD AUTO: 75 %
NITRITE UR QL STRIP: NEGATIVE
NRBC BLD AUTO-RTO: 0 %
PH UR STRIP: 7 [PH]
PLATELET # BLD AUTO: 222 X10(3)/MCL (ref 130–400)
PMV BLD AUTO: 11.2 FL (ref 7.4–10.4)
POTASSIUM SERPL-SCNC: 3.5 MMOL/L (ref 3.5–5.1)
PROT SERPL-MCNC: 8.4 GM/DL (ref 6.4–8.3)
PROT UR QL STRIP: ABNORMAL
RBC # BLD AUTO: 4.55 X10(6)/MCL (ref 4.2–5.4)
RBC #/AREA URNS AUTO: >100 /HPF
SODIUM SERPL-SCNC: 140 MMOL/L (ref 136–145)
SOURCE (OHS): NORMAL
SP GR UR STRIP.AUTO: 1.02 (ref 1–1.03)
SQUAMOUS #/AREA URNS LPF: ABNORMAL /HPF
UROBILINOGEN UR STRIP-ACNC: 2
WBC # BLD AUTO: 7.59 X10(3)/MCL (ref 4.5–11.5)
WBC #/AREA URNS AUTO: ABNORMAL /HPF

## 2024-11-14 PROCEDURE — 63600175 PHARM REV CODE 636 W HCPCS: Performed by: PHYSICIAN ASSISTANT

## 2024-11-14 PROCEDURE — 85025 COMPLETE CBC W/AUTO DIFF WBC: CPT | Performed by: PHYSICIAN ASSISTANT

## 2024-11-14 PROCEDURE — 63600175 PHARM REV CODE 636 W HCPCS

## 2024-11-14 PROCEDURE — 99285 EMERGENCY DEPT VISIT HI MDM: CPT | Mod: 25

## 2024-11-14 PROCEDURE — 87591 N.GONORRHOEAE DNA AMP PROB: CPT

## 2024-11-14 PROCEDURE — 80053 COMPREHEN METABOLIC PANEL: CPT | Performed by: PHYSICIAN ASSISTANT

## 2024-11-14 PROCEDURE — 25500020 PHARM REV CODE 255

## 2024-11-14 PROCEDURE — 81001 URINALYSIS AUTO W/SCOPE: CPT | Performed by: PHYSICIAN ASSISTANT

## 2024-11-14 PROCEDURE — 96374 THER/PROPH/DIAG INJ IV PUSH: CPT

## 2024-11-14 PROCEDURE — 81025 URINE PREGNANCY TEST: CPT | Performed by: PHYSICIAN ASSISTANT

## 2024-11-14 PROCEDURE — 96375 TX/PRO/DX INJ NEW DRUG ADDON: CPT

## 2024-11-14 RX ORDER — MORPHINE SULFATE 4 MG/ML
4 INJECTION, SOLUTION INTRAMUSCULAR; INTRAVENOUS
Status: COMPLETED | OUTPATIENT
Start: 2024-11-14 | End: 2024-11-14

## 2024-11-14 RX ORDER — HYDROCODONE BITARTRATE AND ACETAMINOPHEN 7.5; 325 MG/1; MG/1
1 TABLET ORAL EVERY 6 HOURS PRN
Qty: 12 TABLET | Refills: 0 | Status: SHIPPED | OUTPATIENT
Start: 2024-11-14

## 2024-11-14 RX ORDER — HYDROMORPHONE HYDROCHLORIDE 2 MG/ML
1 INJECTION, SOLUTION INTRAMUSCULAR; INTRAVENOUS; SUBCUTANEOUS
Status: COMPLETED | OUTPATIENT
Start: 2024-11-14 | End: 2024-11-14

## 2024-11-14 RX ORDER — ONDANSETRON 4 MG/1
4 TABLET, FILM COATED ORAL EVERY 6 HOURS PRN
Qty: 12 TABLET | Refills: 0 | Status: SHIPPED | OUTPATIENT
Start: 2024-11-14

## 2024-11-14 RX ORDER — METOCLOPRAMIDE HYDROCHLORIDE 5 MG/ML
10 INJECTION INTRAMUSCULAR; INTRAVENOUS
Status: COMPLETED | OUTPATIENT
Start: 2024-11-14 | End: 2024-11-14

## 2024-11-14 RX ORDER — KETOROLAC TROMETHAMINE 30 MG/ML
30 INJECTION, SOLUTION INTRAMUSCULAR; INTRAVENOUS
Status: COMPLETED | OUTPATIENT
Start: 2024-11-14 | End: 2024-11-14

## 2024-11-14 RX ORDER — KETOROLAC TROMETHAMINE 10 MG/1
10 TABLET, FILM COATED ORAL EVERY 6 HOURS PRN
Qty: 20 TABLET | Refills: 0 | Status: SHIPPED | OUTPATIENT
Start: 2024-11-14 | End: 2024-11-19

## 2024-11-14 RX ORDER — ONDANSETRON HYDROCHLORIDE 2 MG/ML
4 INJECTION, SOLUTION INTRAVENOUS
Status: COMPLETED | OUTPATIENT
Start: 2024-11-14 | End: 2024-11-14

## 2024-11-14 RX ADMIN — METOCLOPRAMIDE 10 MG: 5 INJECTION, SOLUTION INTRAMUSCULAR; INTRAVENOUS at 03:11

## 2024-11-14 RX ADMIN — MORPHINE SULFATE 4 MG: 4 INJECTION INTRAVENOUS at 02:11

## 2024-11-14 RX ADMIN — IOHEXOL 100 ML: 350 INJECTION, SOLUTION INTRAVENOUS at 04:11

## 2024-11-14 RX ADMIN — ONDANSETRON 4 MG: 2 INJECTION INTRAMUSCULAR; INTRAVENOUS at 01:11

## 2024-11-14 RX ADMIN — HYDROMORPHONE HYDROCHLORIDE 1 MG: 2 INJECTION INTRAMUSCULAR; INTRAVENOUS; SUBCUTANEOUS at 03:11

## 2024-11-14 RX ADMIN — KETOROLAC TROMETHAMINE 30 MG: 30 INJECTION, SOLUTION INTRAMUSCULAR at 01:11

## 2024-11-14 NOTE — ED PROVIDER NOTES
Encounter Date: 2024       History     Chief Complaint   Patient presents with    Abdominal Pain     C/o LLQ pain & n/v that started this morning. Also reports vaginal bleeding that started yesterday but states period not due for another 1.5 weeks. Hx ovarian cyst & tubal ligation.     29 y.o. White female presents to Emergency Department with a chief complaint of abdominal pain. Symptoms began today and have been constant since onset. Patient reports hx of ovarian cyst; has seen OBGYN regarding this. Associated symptoms include nausea, vaginal bleeding that began yesterday, and vomiting. Symptoms are aggravated with palpation and there are no alleviating factors. The patient denies CP, SOB, fever, chills, dizziness, diarrhea, or recent injury. No other reported symptoms at this time      The history is provided by the patient. No  was used.   Abdominal Pain  The current episode started today. The onset of the illness was abrupt. The problem has not changed since onset.The abdominal pain is located in the LLQ and suprapubic region. The other symptoms of the illness include nausea, vomiting and vaginal bleeding. The other symptoms of the illness do not include fever, fatigue, shortness of breath, diarrhea or dysuria.   Nausea began today.   The vomiting began today.   Symptoms associated with the illness do not include chills, anorexia, constipation, hematuria or back pain.     Review of patient's allergies indicates:   Allergen Reactions    Adhesive     Meperidine Itching     Past Medical History:   Diagnosis Date    ADD (attention deficit disorder)     GERD (gastroesophageal reflux disease)      Past Surgical History:   Procedure Laterality Date     SECTION      CHOLECYSTECTOMY      COLPOSCOPY      ENDOMETRIAL ABLATION      HERNIA REPAIR      OOPHORECTOMY       No family history on file.  Social History     Tobacco Use    Smoking status: Every Day     Types: Cigarettes   Substance  Use Topics    Alcohol use: Never    Drug use: Not Currently     Review of Systems   Constitutional:  Negative for chills, fatigue and fever.   Eyes:  Negative for photophobia and visual disturbance.   Respiratory:  Negative for cough, shortness of breath and stridor.    Cardiovascular:  Negative for chest pain and leg swelling.   Gastrointestinal:  Positive for abdominal pain, nausea and vomiting. Negative for anorexia, constipation and diarrhea.   Genitourinary:  Positive for vaginal bleeding. Negative for dysuria, flank pain and hematuria.   Musculoskeletal:  Negative for back pain, gait problem, joint swelling and myalgias.   All other systems reviewed and are negative.      Physical Exam     Initial Vitals [11/14/24 1335]   BP Pulse Resp Temp SpO2   (!) 164/96 (!) 58 (!) 22 97.9 °F (36.6 °C) 99 %      MAP       --         Physical Exam    Nursing note and vitals reviewed.  Constitutional: She appears well-developed and well-nourished. She is not diaphoretic. She is cooperative.  Non-toxic appearance. No distress.   HENT:   Head: Normocephalic and atraumatic.   Right Ear: External ear normal.   Left Ear: External ear normal.   Nose: Nose normal.   Eyes: Conjunctivae and EOM are normal. Pupils are equal, round, and reactive to light.   Neck: Neck supple.   Normal range of motion.  Cardiovascular:  Normal rate, regular rhythm, S1 normal, S2 normal, normal heart sounds, intact distal pulses and normal pulses.           Pulmonary/Chest: Effort normal and breath sounds normal. No accessory muscle usage. No tachypnea and no bradypnea. No respiratory distress. She has no decreased breath sounds. She has no wheezes. She has no rhonchi. She has no rales.   Abdominal: Abdomen is soft. Bowel sounds are normal. She exhibits no distension. There is abdominal tenderness in the left lower quadrant.   Tenderness noted to outlined area. Abdomen is soft. Guarding noted.        Genitourinary:    Pelvic exam was performed with  patient supine.   Cervix exhibits no motion tenderness, no discharge and no friability.    Vaginal bleeding present.      No vaginal discharge or tenderness.   There is bleeding in the vagina. No tenderness in the vagina.    Genitourinary Comments: Small amount of bleeding noted to vaginal vault. Exam performed with MARIA DEL ROSARIO Dominguez as chaperone. No clots.      Musculoskeletal:         General: Normal range of motion.      Cervical back: Normal range of motion and neck supple.     Neurological: She is alert and oriented to person, place, and time. She has normal strength. No sensory deficit. GCS score is 15. GCS eye subscore is 4. GCS verbal subscore is 5. GCS motor subscore is 6.   Skin: Skin is warm and dry. Capillary refill takes less than 2 seconds.   Psychiatric: She has a normal mood and affect. Thought content normal.         ED Course   Procedures  Labs Reviewed   COMPREHENSIVE METABOLIC PANEL - Abnormal       Result Value    Sodium 140      Potassium 3.5      Chloride 109 (*)     CO2 22      Glucose 120 (*)     Blood Urea Nitrogen 13.0      Creatinine 0.69      Calcium 10.2      Protein Total 8.4 (*)     Albumin 5.0      Globulin 3.4      Albumin/Globulin Ratio 1.5      Bilirubin Total 0.8      ALP 72      ALT 49      AST 28      eGFR >60      Anion Gap 9.0      BUN/Creatinine Ratio 19     URINALYSIS, REFLEX TO URINE CULTURE - Abnormal    Color, UA Light-Orange (*)     Appearance, UA Turbid (*)     Specific Gravity, UA 1.025      pH, UA 7.0      Protein, UA 1+ (*)     Glucose, UA Normal      Ketones, UA 2+ (*)     Blood, UA 3+ (*)     Bilirubin, UA Negative      Urobilinogen, UA 2.0 (*)     Nitrites, UA Negative      Leukocyte Esterase, UA 25 (*)     RBC, UA >100 (*)     WBC, UA 0-5      Bacteria, UA Trace      Squamous Epithelial Cells, UA Trace      Mucous, UA Many (*)    CBC WITH DIFFERENTIAL - Abnormal    WBC 7.59      RBC 4.55      Hgb 14.5      Hct 42.1      MCV 92.5      MCH 31.9 (*)     MCHC 34.4      RDW  13.2      Platelet 222      MPV 11.2 (*)     Neut % 75.0      Lymph % 17.4      Mono % 6.9      Eos % 0.1      Basophil % 0.3      Lymph # 1.32      Neut # 5.70      Mono # 0.52      Eos # 0.01      Baso # 0.02      IG# 0.02      IG% 0.3      NRBC% 0.0     PREGNANCY TEST, URINE RAPID - Normal    hCG Qualitative, Urine Negative     CHLAMYDIA/GONORRHOEAE(GC), PCR    Chlamydia trachomatis PCR Not Detected      N. gonorrhea PCR Not Detected      Source Urine      Narrative:     The Xpert CT/NG test, performed on the [employer] system is a qualitative in vitro real-time polymerase chain reaction (PCR) test for the automated detected and differentiation for genomic DNA from Chlamydia trachomatis (CT) and/or Neisseria gonorrhoeae (NG).   CBC W/ AUTO DIFFERENTIAL    Narrative:     The following orders were created for panel order CBC auto differential.  Procedure                               Abnormality         Status                     ---------                               -----------         ------                     CBC with Differential[[id number]]       Abnormal            Final result                 Please view results for these tests on the individual orders.          Imaging Results              CT Abdomen Pelvis With IV Contrast NO Oral Contrast (Final result)  Result time 11/14/24 16:44:55      Final result by Bryson Monterroso MD (11/14/24 16:44:55)                   Impression:      No abnormality seen      Electronically signed by: Zach Monterroso  Date:    11/14/2024  Time:    16:44               Narrative:    EXAMINATION:  CT ABDOMEN PELVIS WITH IV CONTRAST    CLINICAL HISTORY:  LLQ abdominal pain;    TECHNIQUE:  Low dose axial images, sagittal and coronal reformations were obtained from the lung bases to the pubic symphysis following the IV administration of contrast. Automatic exposure control (AEC) is utilized to reduce patient radiation exposure.    COMPARISON:  None.    FINDINGS:  The lung bases  are clear.    The liver appears normal.  No liver mass or lesion is seen.  Portal and hepatic veins appear normal.    The patient is status post cholecystectomy..    The pancreas appears normal.  No pancreatic mass or lesion is seen.    The spleen shows no acute abnormality.    The adrenal glands appear normal.  No adrenal nodule is seen.    The kidneys appear normal.  No hydronephrosis is seen.  No hydroureter is seen.  No nephrolithiasis is seen.  No obvious ureteral stones are seen.    Urinary bladder appears grossly unremarkable.    No colitis is seen.  No diverticulitis is seen.  No obvious colonic mass or lesion is seen.  The appendix appears normal.    Uterus appears unremarkable.    No free air is seen.  No free fluid is seen.                                       US PELVIS COMPLETE NON OB WITH DOPPLER (XPD) (Final result)  Result time 11/14/24 15:43:30   Procedure changed from US Pelvis Complete Non OB     Final result by Bryson Monterroso MD (11/14/24 15:43:30)                   Impression:      Uterus appears unremarkable    Ovaries are not seen      Electronically signed by: Zach Monterroso  Date:    11/14/2024  Time:    15:43               Narrative:    EXAMINATION:  Pelvic ultrasound transvaginal and transabdominal imaging    CLINICAL HISTORY:  Pelvic pain and vaginal bleeding    TECHNIQUE:  Multiple sagittal and transverse images were obtained of the pelvis.    COMPARISON:  None available    FINDINGS:  The uterus is not seen on transabdominal imaging therefore transvaginal imaging was performed.  The uterus measures 7.8 x 3.7 x 4.2 cm.  Endometrium measures 6 mm.  No uterine mass or lesion is seen.  No endometrial fluid collection or lesion is seen.    The right ovary is not seen consistent with patient's history of previous oophorectomy.  The left ovary is not visualized due to overlying bowel gas.  Adnexa appears grossly unremarkable.                                       Medications    ondansetron injection 4 mg (4 mg Intravenous Given 11/14/24 1352)   ketorolac injection 30 mg (30 mg Intravenous Given 11/14/24 1348)   morphine injection 4 mg (4 mg Intravenous Given 11/14/24 1432)   metoclopramide injection 10 mg (10 mg Intravenous Given 11/14/24 1558)   HYDROmorphone (PF) injection 1 mg (1 mg Intravenous Given 11/14/24 1557)   iohexoL (OMNIPAQUE 350) injection 100 mL (100 mLs Intravenous Given 11/14/24 1638)     Medical Decision Making  Patient awake, alert, has non-labored breathing, and follows commands appropriately. Arrived to ED due to abdominal pain and n/v. Symptoms began today. Patient reports pain is to LLQ. Afebrile. NAD Noted.     Judging by the patient's chief complaint and pertinent history, the patient has the following possible differential diagnoses, including but not limited to the following: Ovarian Cyst Rupture, Abdominal Pain, UTI     Some of these are deemed to be lower likelihood and some more likely based on my physical exam and history combined with possible lab work and/or imaging studies. Please see the pertinent studies, and refer to the HPI. Some of these diagnoses will take further evaluation to fully rule out, perhaps as an outpatient and the patient was encouraged to follow up when discharged for more comprehensive evaluation.       Amount and/or Complexity of Data Reviewed  Labs:  Decision-making details documented in ED Course.  Radiology: ordered.    Risk  Prescription drug management.               ED Course as of 11/14/24 1956   Thu Nov 14, 2024   1424 WBC: 7.59 [JA]   1424 Hemoglobin: 14.5 [JA]   1424 Hematocrit: 42.1 [JA]   1549 US PELVIS COMPLETE NON OB WITH DOPPLER (XPD)  The uterus is not seen on transabdominal imaging therefore transvaginal imaging was performed.  The uterus measures 7.8 x 3.7 x 4.2 cm.  Endometrium measures 6 mm.  No uterine mass or lesion is seen.  No endometrial fluid collection or lesion is seen. The right ovary is not seen  consistent with patient's history of previous oophorectomy.  The left ovary is not visualized due to overlying bowel gas.  Adnexa appears grossly unremarkable.   [JA]   1550 Patient returned from US and is vomiting and c/o abdominal pain . Will obtain CT scan of abdomen.  [JA]   1659 CT Abdomen Pelvis With IV Contrast NO Oral Contrast  No abnormality seen [JA]   1714 Upon re-evaluation, patient resting comfortably. Reports significant improvement in symptoms after med admin. Discussed results with patient. No questions. Will add GC/C and if positive will notify patient. Upon pelvic exam no CMT or abnormal discharge. Patient reports she has an appointment with her OBGYN on 11/18/24. Educated patient on strict ER return precautions such as uncontrolled pain, intractable n/v, syncope, dizziness, etc. Verbalized understanding. Prescribed medication for pain. Cautioned on side effects. At disposition, patient has no additional complaints, pain is controlled, patient has outpatient f/u, and non-toxic in appearance. Stable for discharge home.  [JA]      ED Course User Index  [JA] Joi Wright NP                           Clinical Impression:  Final diagnoses:  [R10.32] Left lower quadrant abdominal pain (Primary)  [N93.9] Vaginal bleeding          ED Disposition Condition    Discharge Stable          ED Prescriptions       Medication Sig Dispense Start Date End Date Auth. Provider    HYDROcodone-acetaminophen (NORCO) 7.5-325 mg per tablet Take 1 tablet by mouth every 6 (six) hours as needed for Pain. 12 tablet 11/14/2024 -- Joi Wright NP    ketorolac (TORADOL) 10 mg tablet Take 1 tablet (10 mg total) by mouth every 6 (six) hours as needed for Pain. 20 tablet 11/14/2024 11/19/2024 Joi Wright NP    ondansetron (ZOFRAN) 4 MG tablet Take 1 tablet (4 mg total) by mouth every 6 (six) hours as needed for Nausea. 12 tablet 11/14/2024 -- Joi Wright NP          Follow-up Information       Follow up  With Specialties Details Why Contact Info    PCP  Call in 1 day As needed, If symptoms worsen     [facility] - Emergency Dept Emergency Medicine Go to  If symptoms worsen, As needed [address] [facility] [id number] [phone]    Geneva Greene WHNP Obstetrics and Gynecology Call in 1 day If symptoms worsen, As needed [address] [place] [address] [id number] [phone]               Joi Wright, NP  11/14/24 1959

## 2024-11-14 NOTE — FIRST PROVIDER EVALUATION
"Medical screening examination initiated.  I have conducted a focused provider triage encounter, findings are as follows:    Brief history of present illness:  30 yo female presents to ED for evaluation of left lower abdominal pain with nausea and vomiting starting today. Reports to having heavy vaginal bleeding. Hx of ovarian cyst and tubal ligation    Vitals:    11/14/24 1335   BP: (!) 164/96   Pulse: (!) 58   Resp: (!) 22   Temp: 97.9 °F (36.6 °C)   SpO2: 99%   Weight: 81.6 kg (180 lb)   Height: 5' 6" (1.676 m)       Pertinent physical exam:  Patient is awake and alert and oriented.  Ambulatory to triage.  In no acute distress.      Brief workup plan:  labs, UA, UPT    Preliminary workup initiated; this workup will be continued and followed by the physician or advanced practice provider that is assigned to the patient when roomed.  "

## 2025-04-08 DIAGNOSIS — R19.7 DIARRHEA: Primary | ICD-10-CM

## 2025-04-08 DIAGNOSIS — R79.89 ELEVATED LIVER FUNCTION TESTS: ICD-10-CM

## 2025-04-08 DIAGNOSIS — K76.9 LIVER LESION: ICD-10-CM

## 2025-05-13 ENCOUNTER — HOSPITAL ENCOUNTER (OUTPATIENT)
Dept: RADIOLOGY | Facility: HOSPITAL | Age: 30
Discharge: HOME | End: 2025-05-13
Attending: INTERNAL MEDICINE
Payer: MEDICAID

## 2025-05-13 DIAGNOSIS — R79.89 ELEVATED LIVER FUNCTION TESTS: ICD-10-CM

## 2025-05-13 DIAGNOSIS — R19.7 DIARRHEA: ICD-10-CM

## 2025-05-13 DIAGNOSIS — K76.9 LIVER LESION: ICD-10-CM

## 2025-05-13 PROCEDURE — 25500020 PHARM REV CODE 255: Performed by: INTERNAL MEDICINE

## 2025-05-13 PROCEDURE — 72197 MRI PELVIS W/O & W/DYE: CPT | Mod: TC

## 2025-05-13 PROCEDURE — A9698 NON-RAD CONTRAST MATERIALNOC: HCPCS | Performed by: INTERNAL MEDICINE

## 2025-05-13 PROCEDURE — A9577 INJ MULTIHANCE: HCPCS | Performed by: INTERNAL MEDICINE

## 2025-05-13 RX ADMIN — BARIUM SULFATE 900 ML: 1 SUSPENSION ORAL at 11:05

## 2025-05-13 RX ADMIN — GADOBENATE DIMEGLUMINE 15 ML: 529 INJECTION, SOLUTION INTRAVENOUS at 11:05

## 2025-05-15 ENCOUNTER — HOSPITAL ENCOUNTER (OUTPATIENT)
Dept: RADIOLOGY | Facility: HOSPITAL | Age: 30
Discharge: HOME | End: 2025-05-15
Attending: INTERNAL MEDICINE
Payer: MEDICAID

## 2025-05-15 DIAGNOSIS — K76.9 LIVER LESION: ICD-10-CM

## 2025-05-15 DIAGNOSIS — R79.89 ELEVATED LIVER FUNCTION TESTS: ICD-10-CM

## 2025-05-15 DIAGNOSIS — R19.7 DIARRHEA: ICD-10-CM

## 2025-05-15 PROCEDURE — 25500020 PHARM REV CODE 255: Performed by: INTERNAL MEDICINE

## 2025-05-15 PROCEDURE — 74183 MRI ABD W/O CNTR FLWD CNTR: CPT | Mod: TC

## 2025-05-15 PROCEDURE — A9577 INJ MULTIHANCE: HCPCS | Performed by: INTERNAL MEDICINE

## 2025-05-15 RX ADMIN — GADOBENATE DIMEGLUMINE 10 ML: 529 INJECTION, SOLUTION INTRAVENOUS at 09:05

## 2025-06-05 DIAGNOSIS — R79.89 ELEVATED LIVER FUNCTION TESTS: ICD-10-CM

## 2025-06-05 DIAGNOSIS — R63.4 WEIGHT LOSS: ICD-10-CM

## 2025-06-05 DIAGNOSIS — R11.2 NAUSEA WITH VOMITING: ICD-10-CM

## 2025-06-05 DIAGNOSIS — K76.9 LIVER LESION: ICD-10-CM

## 2025-06-05 DIAGNOSIS — R19.7 DIARRHEA: ICD-10-CM

## 2025-06-05 DIAGNOSIS — K63.5 POLYP OF COLON: ICD-10-CM

## 2025-06-05 DIAGNOSIS — K20.81 OTHER ESOPHAGITIS WITH BLEEDING: Primary | ICD-10-CM

## 2025-06-05 DIAGNOSIS — K44.9 DIAPHRAGMATIC HERNIA WITHOUT OBSTRUCTION OR GANGRENE: ICD-10-CM

## 2025-06-05 DIAGNOSIS — K31.5 OBSTRUCTION OF DUODENUM: ICD-10-CM
